# Patient Record
Sex: MALE | Race: ASIAN | NOT HISPANIC OR LATINO | Employment: FULL TIME | URBAN - METROPOLITAN AREA
[De-identification: names, ages, dates, MRNs, and addresses within clinical notes are randomized per-mention and may not be internally consistent; named-entity substitution may affect disease eponyms.]

---

## 2022-11-03 ENCOUNTER — OFFICE VISIT (OUTPATIENT)
Dept: URGENT CARE | Facility: CLINIC | Age: 22
End: 2022-11-03

## 2022-11-03 VITALS
OXYGEN SATURATION: 100 % | HEART RATE: 89 BPM | SYSTOLIC BLOOD PRESSURE: 140 MMHG | TEMPERATURE: 99.7 F | RESPIRATION RATE: 18 BRPM | DIASTOLIC BLOOD PRESSURE: 80 MMHG

## 2022-11-03 DIAGNOSIS — R10.9 FLANK PAIN: Primary | ICD-10-CM

## 2022-11-03 LAB
SL AMB  POCT GLUCOSE, UA: NEGATIVE
SL AMB LEUKOCYTE ESTERASE,UA: NEGATIVE
SL AMB POCT BILIRUBIN,UA: NEGATIVE
SL AMB POCT BLOOD,UA: NEGATIVE
SL AMB POCT CLARITY,UA: CLEAR
SL AMB POCT COLOR,UA: ABNORMAL
SL AMB POCT KETONES,UA: ABNORMAL
SL AMB POCT NITRITE,UA: NEGATIVE
SL AMB POCT PH,UA: 6.5
SL AMB POCT SPECIFIC GRAVITY,UA: 1.02
SL AMB POCT URINE PROTEIN: NEGATIVE
SL AMB POCT UROBILINOGEN: 0.2

## 2022-11-03 RX ORDER — MONTELUKAST SODIUM 10 MG/1
TABLET ORAL
COMMUNITY
Start: 2022-08-11

## 2022-11-03 NOTE — PROGRESS NOTES
[facility]        NAME: Hipolito Solis is a 20 y o  male  : 2000    MRN: [id number]  DATE: November 3, 2022  TIME: 5:39 PM    Assessment and Plan   Flank pain [R10 9]  1  Flank pain  POCT urine dip         Patient Instructions       Follow up with PCP in 3-5 days  Proceed to  ER if symptoms worsen  Chief Complaint     Chief Complaint   Patient presents with   • Flank Pain     Pt reports BL flank pain since this morning  No increased urgency or frequency  History of Present Illness       Yesterday in the morning he had bilateral flank pain and a sensation that he had urinate  This abated and then the sensation happened again the next morning  Since then no symptoms  No urgency no frequency  Penile discharge  Ice concern about sexually transmitted diseases  No GI symptoms  Review of Systems   Review of Systems   Genitourinary: Positive for flank pain and urgency  Current Medications       Current Outpatient Medications:   •  montelukast (SINGULAIR) 10 mg tablet, , Disp: , Rfl:     Current Allergies     Allergies as of 2022   • (No Known Allergies)            The following portions of the patient's history were reviewed and updated as appropriate: allergies, current medications, past family history, past medical history, past social history, past surgical history and problem list      Past Medical History:   Diagnosis Date   • Allergic    • Asthma        History reviewed  No pertinent surgical history  No family history on file  Medications have been verified  Objective   /80   Pulse 89   Temp 99 7 °F (37 6 °C)   Resp 18   SpO2 100%   No LMP for male patient         Physical Exam     Physical Exam  Genitourinary:     Comments: No CVA tenderness to percussion      Urinalysis shows no red cells no leukocyte

## 2022-11-03 NOTE — PATIENT INSTRUCTIONS
At the present time I can not explain the flank pain or the urgency    If this continues to happen please return for further examination

## 2024-01-12 ENCOUNTER — APPOINTMENT (EMERGENCY)
Dept: RADIOLOGY | Facility: HOSPITAL | Age: 24
End: 2024-01-12
Payer: COMMERCIAL

## 2024-01-12 ENCOUNTER — OFFICE VISIT (OUTPATIENT)
Dept: URGENT CARE | Facility: CLINIC | Age: 24
End: 2024-01-12
Payer: COMMERCIAL

## 2024-01-12 ENCOUNTER — HOSPITAL ENCOUNTER (EMERGENCY)
Facility: HOSPITAL | Age: 24
Discharge: HOME | End: 2024-01-12
Attending: EMERGENCY MEDICINE
Payer: COMMERCIAL

## 2024-01-12 VITALS
OXYGEN SATURATION: 99 % | DIASTOLIC BLOOD PRESSURE: 82 MMHG | RESPIRATION RATE: 18 BRPM | HEART RATE: 94 BPM | TEMPERATURE: 99.5 F | SYSTOLIC BLOOD PRESSURE: 130 MMHG | WEIGHT: 216.8 LBS

## 2024-01-12 VITALS
DIASTOLIC BLOOD PRESSURE: 95 MMHG | TEMPERATURE: 97.2 F | RESPIRATION RATE: 18 BRPM | OXYGEN SATURATION: 100 % | SYSTOLIC BLOOD PRESSURE: 168 MMHG | HEART RATE: 94 BPM

## 2024-01-12 DIAGNOSIS — N20.0 KIDNEY STONE: ICD-10-CM

## 2024-01-12 DIAGNOSIS — R10.9 FLANK PAIN: Primary | ICD-10-CM

## 2024-01-12 DIAGNOSIS — R30.0 BURNING WITH URINATION: Primary | ICD-10-CM

## 2024-01-12 DIAGNOSIS — N20.1 URETEROLITHIASIS: ICD-10-CM

## 2024-01-12 DIAGNOSIS — R11.0 NAUSEA: ICD-10-CM

## 2024-01-12 LAB
ATRIAL RATE: 87 BPM
BACTERIA UR QL AUTO: NORMAL /HPF
BILIRUB UR QL STRIP: NEGATIVE
CLARITY UR: CLEAR
COLOR UR: COLORLESS
GLUCOSE UR STRIP-MCNC: NEGATIVE MG/DL
HGB UR QL STRIP.AUTO: ABNORMAL
KETONES UR STRIP-MCNC: NEGATIVE MG/DL
LEUKOCYTE ESTERASE UR QL STRIP: NEGATIVE
NITRITE UR QL STRIP: NEGATIVE
NON-SQ EPI CELLS URNS QL MICRO: NORMAL /HPF
P AXIS: 10 DEGREES
PH UR STRIP.AUTO: 5.5 [PH]
PR INTERVAL: 124 MS
PROT UR STRIP-MCNC: NEGATIVE MG/DL
QRS AXIS: 97 DEGREES
QRSD INTERVAL: 92 MS
QT INTERVAL: 346 MS
QTC INTERVAL: 416 MS
RBC #/AREA URNS AUTO: NORMAL /HPF
SL AMB  POCT GLUCOSE, UA: ABNORMAL
SL AMB LEUKOCYTE ESTERASE,UA: ABNORMAL
SL AMB POCT BILIRUBIN,UA: ABNORMAL
SL AMB POCT BLOOD,UA: ABNORMAL
SL AMB POCT CLARITY,UA: CLEAR
SL AMB POCT COLOR,UA: YELLOW
SL AMB POCT KETONES,UA: ABNORMAL
SL AMB POCT NITRITE,UA: ABNORMAL
SL AMB POCT PH,UA: 6
SL AMB POCT SPECIFIC GRAVITY,UA: 1.02
SL AMB POCT URINE PROTEIN: ABNORMAL
SL AMB POCT UROBILINOGEN: ABNORMAL
SP GR UR STRIP.AUTO: 1.01 (ref 1–1.03)
T WAVE AXIS: 38 DEGREES
UROBILINOGEN UR STRIP-ACNC: <2 MG/DL
VENTRICULAR RATE: 87 BPM
WBC #/AREA URNS AUTO: NORMAL /HPF

## 2024-01-12 PROCEDURE — 99284 EMERGENCY DEPT VISIT MOD MDM: CPT | Performed by: EMERGENCY MEDICINE

## 2024-01-12 PROCEDURE — G1004 CDSM NDSC: HCPCS

## 2024-01-12 PROCEDURE — 93005 ELECTROCARDIOGRAM TRACING: CPT

## 2024-01-12 PROCEDURE — 87086 URINE CULTURE/COLONY COUNT: CPT

## 2024-01-12 PROCEDURE — 81001 URINALYSIS AUTO W/SCOPE: CPT

## 2024-01-12 PROCEDURE — 81002 URINALYSIS NONAUTO W/O SCOPE: CPT

## 2024-01-12 PROCEDURE — 99213 OFFICE O/P EST LOW 20 MIN: CPT

## 2024-01-12 PROCEDURE — 96372 THER/PROPH/DIAG INJ SC/IM: CPT

## 2024-01-12 PROCEDURE — 99284 EMERGENCY DEPT VISIT MOD MDM: CPT

## 2024-01-12 PROCEDURE — 74176 CT ABD & PELVIS W/O CONTRAST: CPT

## 2024-01-12 RX ORDER — ONDANSETRON 4 MG/1
4 TABLET, FILM COATED ORAL EVERY 6 HOURS
Qty: 12 TABLET | Refills: 0 | Status: SHIPPED | OUTPATIENT
Start: 2024-01-12 | End: 2024-01-31

## 2024-01-12 RX ORDER — TAMSULOSIN HYDROCHLORIDE 0.4 MG/1
0.4 CAPSULE ORAL
Qty: 5 CAPSULE | Refills: 0 | Status: SHIPPED | OUTPATIENT
Start: 2024-01-12 | End: 2024-01-31

## 2024-01-12 RX ORDER — IBUPROFEN 600 MG/1
600 TABLET ORAL EVERY 6 HOURS PRN
Qty: 30 TABLET | Refills: 0 | Status: SHIPPED | OUTPATIENT
Start: 2024-01-12

## 2024-01-12 RX ORDER — KETOROLAC TROMETHAMINE 30 MG/ML
15 INJECTION, SOLUTION INTRAMUSCULAR; INTRAVENOUS ONCE
Status: COMPLETED | OUTPATIENT
Start: 2024-01-12 | End: 2024-01-12

## 2024-01-12 RX ORDER — OXYCODONE HYDROCHLORIDE 5 MG/1
5 TABLET ORAL EVERY 4 HOURS PRN
Qty: 12 TABLET | Refills: 0 | Status: SHIPPED | OUTPATIENT
Start: 2024-01-12 | End: 2024-01-31

## 2024-01-12 RX ORDER — ONDANSETRON 4 MG/1
4 TABLET, ORALLY DISINTEGRATING ORAL ONCE
Status: COMPLETED | OUTPATIENT
Start: 2024-01-12 | End: 2024-01-12

## 2024-01-12 RX ORDER — ACETAMINOPHEN 325 MG/1
975 TABLET ORAL ONCE
Status: COMPLETED | OUTPATIENT
Start: 2024-01-12 | End: 2024-01-12

## 2024-01-12 RX ORDER — ONDANSETRON 4 MG/1
4 TABLET, ORALLY DISINTEGRATING ORAL EVERY 6 HOURS PRN
Status: SHIPPED | OUTPATIENT
Start: 2024-01-12

## 2024-01-12 RX ORDER — NAPROXEN 500 MG/1
500 TABLET ORAL 2 TIMES DAILY WITH MEALS
Qty: 30 TABLET | Refills: 0 | Status: SHIPPED | OUTPATIENT
Start: 2024-01-12 | End: 2024-01-31

## 2024-01-12 RX ADMIN — ACETAMINOPHEN 975 MG: 325 TABLET, FILM COATED ORAL at 20:57

## 2024-01-12 RX ADMIN — KETOROLAC TROMETHAMINE 15 MG: 30 INJECTION, SOLUTION INTRAMUSCULAR; INTRAVENOUS at 20:57

## 2024-01-12 RX ADMIN — ONDANSETRON 4 MG: 4 TABLET, ORALLY DISINTEGRATING ORAL at 20:57

## 2024-01-13 LAB — BACTERIA UR CULT: NORMAL

## 2024-01-13 NOTE — ED PROVIDER NOTES
History  Chief Complaint   Patient presents with    Flank Pain     PT was sent from urgent care for right flank pain radiates towards back. Pt states he had pain while urinating earlier and went to urgent care. They told him to get checked out here.      23-year-old male no significant past medical history presents to ED with 1 day of right-sided flank pain dysuria and nausea.  Patient woke this morning and used usual state of health, gradually developed symptoms throughout the day.  He presented to urgent care where he had a urinalysis showing hematuria however no bacteria or leukocytes.  He was referred to the ED for evaluation.  He currently endorses mild nausea but no vomiting right-sided flank pain which radiates to the suprapubic region.  No history of abdominal surgeries, no history of kidney stones.  No medication taken prior to ED visit.  No fevers chills.  No penile discharge.        Prior to Admission Medications   Prescriptions Last Dose Informant Patient Reported? Taking?   ibuprofen (MOTRIN) 600 mg tablet   No No   Sig: Take 1 tablet (600 mg total) by mouth every 6 (six) hours as needed for mild pain or moderate pain   montelukast (SINGULAIR) 10 mg tablet   Yes No      Facility-Administered Medications Last Administration Doses Remaining   ondansetron (ZOFRAN-ODT) dispersible tablet 4 mg None recorded           Past Medical History:   Diagnosis Date    Allergic     Asthma        History reviewed. No pertinent surgical history.    History reviewed. No pertinent family history.  I have reviewed and agree with the history as documented.    E-Cigarette/Vaping     E-Cigarette/Vaping Substances     Social History     Tobacco Use    Smoking status: Never    Smokeless tobacco: Never        Review of Systems   Constitutional:  Negative for chills and fever.   HENT:  Negative for ear pain and sore throat.    Eyes:  Negative for pain and visual disturbance.   Respiratory:  Negative for cough and shortness of  breath.    Cardiovascular:  Negative for chest pain and palpitations.   Gastrointestinal:  Negative for abdominal pain and vomiting.   Genitourinary:  Positive for dysuria and flank pain. Negative for hematuria.   Musculoskeletal:  Negative for arthralgias and back pain.   Skin:  Negative for color change and rash.   Neurological:  Negative for seizures and syncope.   All other systems reviewed and are negative.      Physical Exam  ED Triage Vitals [01/12/24 1957]   Temperature Pulse Respirations Blood Pressure SpO2   (!) 97.2 °F (36.2 °C) 94 18 168/95 100 %      Temp Source Heart Rate Source Patient Position - Orthostatic VS BP Location FiO2 (%)   Oral -- -- -- --      Pain Score       6             Orthostatic Vital Signs  Vitals:    01/12/24 1957   BP: 168/95   Pulse: 94       Physical Exam  Vitals and nursing note reviewed.   Constitutional:       General: He is not in acute distress.     Appearance: He is well-developed.   HENT:      Head: Normocephalic and atraumatic.   Eyes:      Conjunctiva/sclera: Conjunctivae normal.   Cardiovascular:      Rate and Rhythm: Normal rate and regular rhythm.      Heart sounds: No murmur heard.  Pulmonary:      Effort: Pulmonary effort is normal. No respiratory distress.      Breath sounds: Normal breath sounds.   Abdominal:      Palpations: Abdomen is soft.      Tenderness: There is abdominal tenderness in the suprapubic area. There is right CVA tenderness.   Musculoskeletal:         General: No swelling.      Cervical back: Neck supple.   Skin:     General: Skin is warm and dry.      Capillary Refill: Capillary refill takes less than 2 seconds.   Neurological:      Mental Status: He is alert.   Psychiatric:         Mood and Affect: Mood normal.         ED Medications  Medications   ketorolac (TORADOL) injection 15 mg (15 mg Intramuscular Given 1/12/24 2057)   ondansetron (ZOFRAN-ODT) dispersible tablet 4 mg (4 mg Oral Given 1/12/24 2057)   acetaminophen (TYLENOL) tablet 974  mg (975 mg Oral Given 1/12/24 2057)       Diagnostic Studies  Results Reviewed       Procedure Component Value Units Date/Time    Urine Microscopic [[id number]]  (Normal) Collected: 01/12/24 2141    Lab Status: Final result Specimen: Urine, Clean Catch Updated: 01/12/24 2210     RBC, UA 1-2 /hpf      WBC, UA 1-2 /hpf      Epithelial Cells Occasional /hpf      Bacteria, UA None Seen /hpf     UA w Reflex to Microscopic w Reflex to Culture [[id number]]  (Abnormal) Collected: 01/12/24 2141    Lab Status: Final result Specimen: Urine, Clean Catch Updated: 01/12/24 2209     Color, UA Colorless     Clarity, UA Clear     Specific Gravity, UA 1.010     pH, UA 5.5     Leukocytes, UA Negative     Nitrite, UA Negative     Protein, UA Negative mg/dl      Glucose, UA Negative mg/dl      Ketones, UA Negative mg/dl      Urobilinogen, UA <2.0 mg/dl      Bilirubin, UA Negative     Occult Blood, UA Trace                   CT renal stone study abdomen pelvis wo contrast   Final Result by Omid Rider MD (01/12 2133)      3 mm calculus at the right ureterovesicular junction with associated trace right hydronephrosis.            Workstation performed: [id number]               Procedures  Procedures      ED Course  ED Course as of 01/12/24 2224 Fri Jan 12, 2024 2137 UA w Reflex to Microscopic w Reflex to Culture                             SBIRT 22yo+      Flowsheet Row Most Recent Value   Initial Alcohol Screen: US AUDIT-C     1. How often do you have a drink containing alcohol? 0 Filed at: 01/12/2024 1958   2. How many drinks containing alcohol do you have on a typical day you are drinking?  0 Filed at: 01/12/2024 1958   3a. Male UNDER 65: How often do you have five or more drinks on one occasion? 0 Filed at: 01/12/2024 1958   3b. FEMALE Any Age, or MALE 65+: How often do you have 4 or more drinks on one occassion? 0 Filed at: 01/12/2024 1958   Audit-C Score 0 Filed at: 01/12/2024 1958   CHAD: How many times in the past year have  you...    Used an illegal drug or used a prescription medication for non-medical reasons? Never Filed at: 01/12/2024 1958                  Medical Decision Making  23-year-old male no significant past medical history presents to ED with 1 day of right-sided flank pain dysuria and nausea.      DDx: Kidney stone versus cystitis versus appendicitis (less likely considering flank pain radiating to suprapubic region).    Will obtain urinalysis and CT without contrast to evaluate for kidney stone.  CT positive for small 3 mm stone located at the UVJ.  Mild hydro, no signs of infection.  Pain controlled with Zofran and Toradol.  Discussed continued conservative management at home with pain control and antiemetics. Discussed strict return precautions for development of fevers, chills, intractable pain nausea or vomiting.  Patient understanding.  Otherwise patient stable for discharge with urology outpatient follow-up.  Patient understanding, discharged home in stable condition, ambulated off ED floor on her own without any difficulty.       Amount and/or Complexity of Data Reviewed  Labs:  Decision-making details documented in ED Course.  Radiology: ordered.    Risk  OTC drugs.  Prescription drug management.          Disposition  Final diagnoses:   Flank pain   Ureterolithiasis   Kidney stone     Time reflects when diagnosis was documented in both MDM as applicable and the Disposition within this note       Time User Action Codes Description Comment    1/12/2024  9:47 PM Katherine Delatorre [R10.9] Flank pain     1/12/2024  9:47 PM Katherine Delatorre Add [N20.1] Ureterolithiasis     1/12/2024  9:47 PM Migue Keyes Add [N20.0] Kidney stone           ED Disposition       ED Disposition   Discharge    Condition   Stable    Date/Time   Fri Jan 12, 2024 2210    Comment   Hemant Recinos discharge to home/self care.                   Follow-up Information       Follow up With Specialties Details Why Contact Info Additional Information    [facility] [place] [facility] Call   [address]  [facility] [address] [phone] [facility] [place], [address], [place], [place], [id number] [phone]            Discharge Medication List as of 1/12/2024 10:13 PM        START taking these medications    Details   naproxen (Naprosyn) 500 mg tablet Take 1 tablet (500 mg total) by mouth 2 (two) times a day with meals, Starting Fri 1/12/2024, Normal      ondansetron (ZOFRAN) 4 mg tablet Take 1 tablet (4 mg total) by mouth every 6 (six) hours, Starting Fri 1/12/2024, Normal      oxyCODONE (Roxicodone) 5 immediate release tablet Take 1 tablet (5 mg total) by mouth every 4 (four) hours as needed for moderate pain for up to 12 doses Max Daily Amount: 30 mg, Starting Fri 1/12/2024, Normal      tamsulosin (FLOMAX) 0.4 mg Take 1 capsule (0.4 mg total) by mouth daily with dinner for 5 days, Starting Fri 1/12/2024, Until Wed 1/17/2024, Normal           CONTINUE these medications which have NOT CHANGED    Details   ibuprofen (MOTRIN) 600 mg tablet Take 1 tablet (600 mg total) by mouth every 6 (six) hours as needed for mild pain or moderate pain, Starting Fri 1/12/2024, Normal      montelukast (SINGULAIR) 10 mg tablet Starting u 8/11/2022, Historical Med               PDMP Review       None             ED Provider  Attending physically available and evaluated Hemant Recinos. I managed the patient along with the ED Attending.    Electronically Signed by           Katherine Delatorre MD  01/12/24 9255

## 2024-01-13 NOTE — DISCHARGE INSTRUCTIONS
You were seen in the Emergency Department today for flank pain.    We performed a CT scan which shows you have a small 3mm kidney stone on the right without any signs of a urinary tract infection. We  are sending you home with medication to take for pain (naproxen and tylenol for mild-moderate pain) (oxycodone for severe pain), and zofran for nausea.     Continue to hydrate well at home.    Please follow up with your primary care doctor in 2 to 3 days for recheck.   Please return to the Emergency Department if you experience worsening of your current symptoms or any other concerning symptoms.

## 2024-01-13 NOTE — PROGRESS NOTES
[facility]        NAME: Hemant Recinos is a 23 y.o. male  : 2000    MRN: [id number]  DATE: 2024  TIME: 7:14 PM    Assessment and Plan   Burning with urination [R30.0]  1. Burning with urination  POCT urine dip    POCT urine dip    Urine culture    ibuprofen (MOTRIN) 600 mg tablet    CANCELED: Urine culture            Patient Instructions   Increase your fluids  Take the 600 and Motrin for pain 6 hours as needed  Your urine was negative for bacteria but did have blood  If you notice increased blood in your urine or your pain increases in your right lower back go to the emergency room for further workup  You can take Zofran every 8 hours as needed for nausea  Follow up with PCP in 3-5 days.  Proceed to  ER if symptoms worsen.    Chief Complaint     Chief Complaint   Patient presents with    Back Pain     Patient reports low back pain started this morning, right sided sharp pain a little on the left. Currently taking Cystex, was helping with urinating, he reports it was burning this morning upon urination.         History of Present Illness       This is a 23-year-old male who presents today with onset of burning with urination that started this morning.  He started with right sharp lank pain intermittent in nature he states when he gets the pain it takes his appetite away.  He states he has slight chills but he is not sure if it is just because he is cold he states he does get the pain after voiding.  He denies any abdominal pain nausea vomiting or diarrhea    Back Pain  Associated symptoms include dysuria. Pertinent negatives include no abdominal pain, chest pain or fever.       Review of Systems   Review of Systems   Constitutional:  Negative for chills, fatigue and fever.   HENT: Negative.     Eyes: Negative.    Respiratory: Negative.  Negative for shortness of breath.    Cardiovascular:  Negative for chest pain.   Gastrointestinal:  Negative for abdominal distention, abdominal pain,  diarrhea, nausea and vomiting.   Genitourinary:  Positive for dysuria. Negative for penile discharge, penile pain and penile swelling.   Musculoskeletal:  Positive for back pain.         Current Medications       Current Outpatient Medications:     ibuprofen (MOTRIN) 600 mg tablet, Take 1 tablet (600 mg total) by mouth every 6 (six) hours as needed for mild pain or moderate pain, Disp: 30 tablet, Rfl: 0    montelukast (SINGULAIR) 10 mg tablet, , Disp: , Rfl:     Current Allergies     Allergies as of 01/12/2024    (No Known Allergies)            The following portions of the patient's history were reviewed and updated as appropriate: allergies, current medications, past family history, past medical history, past social history, past surgical history and problem list.     Past Medical History:   Diagnosis Date    Allergic     Asthma        History reviewed. No pertinent surgical history.    History reviewed. No pertinent family history.      Medications have been verified.        Objective   /82   Pulse 94   Temp 99.5 °F (37.5 °C) (Tympanic)   Resp 18   Wt 98.3 kg (216 lb 12.8 oz)   SpO2 99%   No LMP for male patient.       Physical Exam     Physical Exam  Constitutional:       Appearance: Normal appearance.   HENT:      Head: Normocephalic and atraumatic.      Right Ear: Tympanic membrane, ear canal and external ear normal.      Left Ear: Tympanic membrane, ear canal and external ear normal.      Nose: Nose normal.      Mouth/Throat:      Mouth: Mucous membranes are moist.      Pharynx: Oropharynx is clear.   Eyes:      Pupils: Pupils are equal, round, and reactive to light.   Cardiovascular:      Rate and Rhythm: Normal rate and regular rhythm.      Pulses: Normal pulses.      Heart sounds: Normal heart sounds.   Pulmonary:      Effort: Pulmonary effort is normal.      Breath sounds: Normal breath sounds.   Abdominal:      General: Abdomen is flat.      Tenderness: There is no abdominal tenderness. There  is right CVA tenderness.   Musculoskeletal:         General: Normal range of motion.      Cervical back: Normal range of motion.   Skin:     General: Skin is warm and dry.      Capillary Refill: Capillary refill takes less than 2 seconds.   Neurological:      General: No focal deficit present.      Mental Status: He is alert and oriented to person, place, and time.   Psychiatric:         Mood and Affect: Mood normal.         Thought Content: Thought content normal.         Judgment: Judgment normal.

## 2024-01-13 NOTE — PATIENT INSTRUCTIONS
Increase your fluids  Take the 600 and Motrin for pain 6 hours as needed  Your urine was negative for bacteria but did have blood  If you notice increased blood in your urine or your pain increases in your right lower back go to the emergency room for further workup  You can take Zofran every 8 hours as needed for nausea

## 2024-01-21 NOTE — ED ATTENDING ATTESTATION
Final Diagnoses:     1. Flank pain    2. Ureterolithiasis    3. Kidney stone           I, Migue Keyes DO, saw and evaluated the patient. All available labs and X-rays were ordered by me or the resident / non-physician and have been reviewed by myself. I discussed the patient with the resident / non-physician and agree with the resident's / non-physician practitioner's findings and plan as documented in the resident's / non-physician practicitioner's note, except where noted.   At this point, I agree with the current assessment done in the ED.   I was present during key portions of all procedures performed unless otherwise stated.     Nursing Triage:     Chief Complaint   Patient presents with    Flank Pain     PT was sent from urgent care for right flank pain radiates towards back. Pt states he had pain while urinating earlier and went to urgent care. They told him to get checked out here.        HPI:   This is a 23 y.o. male presenting for evaluation of right flank pain.  Cannot get comfortable.  Onset today.  Worse with urinating.  No penile discharge no rash no fevers no other associated symptoms    ASSESSMENT + PLAN:   Flank pain doubt UTI will check urinalysis, doubt appendicitis given lack of right lower quadrant tenderness plan CT renal study to evaluate for kidney stone    Physical:     Vital signs reviewed.  Gen. appearance: No acute distress. Alert and oriented x3.  Head: Atraumatic, normocephalic.  Eyes: EOMI, PERRLA. No icterus.  Neck: Supple, no lymphadenopathy, full range of motion.  Chest: Regular rate and rhythm. Lungs are clear to auscultation bilaterally. Nontender.  Abdomen: Soft nontender nondistended. No signs of ecchymosis. Positive bowel sounds.  Extremities: Full range of motion in all extremities. DTRs intact.  Neuro: Cranial nerves II through XII intact. Nonfocal exam. GCS 15  Skin: Warm, dry.Vital signs reviewed.            Past Medical:    has a past medical history of Allergic and  "Asthma.    Past Surgical:    has no past surgical history on file.      CT renal stone study abdomen pelvis wo contrast   Final Result by Omid Rider MD (01/12 2133)      3 mm calculus at the right ureterovesicular junction with associated trace right hydronephrosis.            Workstation performed: [id number]             Procedures      I reviewed patient's most recent discharge summary and/or outpatient office notes.      Portions of the record may have been created with voice recognition software. Occasional wrong word or \"sound a like\" substitutions may have occurred due to the inherent limitations of voice recognition software. Read the chart carefully and recognize, using context, where substitutions have occurred.    Electronically signed:  Migue Keyes, DO  "

## 2024-01-31 ENCOUNTER — OFFICE VISIT (OUTPATIENT)
Dept: FAMILY MEDICINE CLINIC | Facility: CLINIC | Age: 24
End: 2024-01-31
Payer: COMMERCIAL

## 2024-01-31 VITALS
BODY MASS INDEX: 33.82 KG/M2 | SYSTOLIC BLOOD PRESSURE: 120 MMHG | HEART RATE: 83 BPM | DIASTOLIC BLOOD PRESSURE: 88 MMHG | RESPIRATION RATE: 18 BRPM | OXYGEN SATURATION: 98 % | TEMPERATURE: 98 F | HEIGHT: 67 IN | WEIGHT: 215.5 LBS

## 2024-01-31 DIAGNOSIS — N20.0 KIDNEY STONE: ICD-10-CM

## 2024-01-31 DIAGNOSIS — Z00.00 WELL ADULT EXAM: Primary | ICD-10-CM

## 2024-01-31 PROBLEM — R11.0 NAUSEA: Status: RESOLVED | Noted: 2024-01-12 | Resolved: 2024-01-31

## 2024-01-31 PROCEDURE — 99385 PREV VISIT NEW AGE 18-39: CPT | Performed by: FAMILY MEDICINE

## 2024-01-31 NOTE — ASSESSMENT & PLAN NOTE
Kidney stone.  Patient currently asymptomatic.  He will follow-up with urologist for further evaluation

## 2024-01-31 NOTE — ASSESSMENT & PLAN NOTE
Well adult.  Overall the patient appears to be in stable health.  He will obtain blood work as ordered for further evaluation.  We will make further recommendations pending results of test.  He was recommended to establish a more consistent form of exercise working up to a minimum of 20 minutes 3 times a week.

## 2024-02-21 PROBLEM — Z00.00 WELL ADULT EXAM: Status: RESOLVED | Noted: 2024-01-31 | Resolved: 2024-02-21

## 2024-03-09 ENCOUNTER — APPOINTMENT (OUTPATIENT)
Dept: LAB | Facility: CLINIC | Age: 24
End: 2024-03-09
Payer: COMMERCIAL

## 2024-03-09 DIAGNOSIS — Z00.00 WELL ADULT EXAM: ICD-10-CM

## 2024-03-09 DIAGNOSIS — N20.0 KIDNEY STONE: ICD-10-CM

## 2024-03-09 LAB
ALBUMIN SERPL BCP-MCNC: 4.7 G/DL (ref 3.5–5)
ALP SERPL-CCNC: 63 U/L (ref 34–104)
ALT SERPL W P-5'-P-CCNC: 69 U/L (ref 7–52)
ANION GAP SERPL CALCULATED.3IONS-SCNC: 10 MMOL/L
AST SERPL W P-5'-P-CCNC: 32 U/L (ref 13–39)
BILIRUB SERPL-MCNC: 0.33 MG/DL (ref 0.2–1)
BUN SERPL-MCNC: 11 MG/DL (ref 5–25)
CALCIUM SERPL-MCNC: 9.5 MG/DL (ref 8.4–10.2)
CHLORIDE SERPL-SCNC: 102 MMOL/L (ref 96–108)
CHOLEST SERPL-MCNC: 223 MG/DL
CO2 SERPL-SCNC: 28 MMOL/L (ref 21–32)
CREAT SERPL-MCNC: 0.88 MG/DL (ref 0.6–1.3)
ERYTHROCYTE [DISTWIDTH] IN BLOOD BY AUTOMATED COUNT: 13.2 % (ref 11.6–15.1)
GFR SERPL CREATININE-BSD FRML MDRD: 120 ML/MIN/1.73SQ M
GLUCOSE P FAST SERPL-MCNC: 87 MG/DL (ref 65–99)
HCT VFR BLD AUTO: 50.1 % (ref 36.5–49.3)
HDLC SERPL-MCNC: 42 MG/DL
HGB BLD-MCNC: 15.6 G/DL (ref 12–17)
LDLC SERPL CALC-MCNC: 128 MG/DL (ref 0–100)
MCH RBC QN AUTO: 26.6 PG (ref 26.8–34.3)
MCHC RBC AUTO-ENTMCNC: 31.1 G/DL (ref 31.4–37.4)
MCV RBC AUTO: 85 FL (ref 82–98)
NONHDLC SERPL-MCNC: 181 MG/DL
PLATELET # BLD AUTO: 276 THOUSANDS/UL (ref 149–390)
PMV BLD AUTO: 10.2 FL (ref 8.9–12.7)
POTASSIUM SERPL-SCNC: 3.9 MMOL/L (ref 3.5–5.3)
PROT SERPL-MCNC: 7.3 G/DL (ref 6.4–8.4)
RBC # BLD AUTO: 5.87 MILLION/UL (ref 3.88–5.62)
SODIUM SERPL-SCNC: 140 MMOL/L (ref 135–147)
TRIGL SERPL-MCNC: 264 MG/DL
TSH SERPL DL<=0.05 MIU/L-ACNC: 1.09 UIU/ML (ref 0.45–4.5)
WBC # BLD AUTO: 7.25 THOUSAND/UL (ref 4.31–10.16)

## 2024-03-09 PROCEDURE — 85027 COMPLETE CBC AUTOMATED: CPT

## 2024-03-09 PROCEDURE — 36415 COLL VENOUS BLD VENIPUNCTURE: CPT

## 2024-03-09 PROCEDURE — 84443 ASSAY THYROID STIM HORMONE: CPT

## 2024-03-09 PROCEDURE — 80061 LIPID PANEL: CPT

## 2024-03-09 PROCEDURE — 80053 COMPREHEN METABOLIC PANEL: CPT

## 2024-03-28 NOTE — PROGRESS NOTES
"  Assessment and plan:     Ureterolithiasis  -Patient reported to the emergency room on 1/12/2024 with right-sided flank pain  -CT scan that demonstrated a 3 mm calculus at the right UVJ with trace hydronephrosis  -Symptoms resolved around 2 weeks after they initially started  -Kidney and bladder ultrasound and KUB ordered, will call patient with results  -Continue with diet and lifestyle modifications  -No plan for follow-up at this time, can call our office if he starts to have any other symptoms or concerns      History of Present Illness     Hemant Recinos is a 23 y.o. male who presents for follow-up for ureterolithiasis.  He reported to the ER on 1/12/2020 for complaints of right-sided flank pain.  He reports that 2 weeks after he presented to the ER the symptoms resolved and he believes that he passed the kidney stone.  He is not sure if he solid in the toilet.  Today he denies any flank pain, abdominal pain, hematuria, fever, chills, nausea or vomiting.  He denies any history or family history of kidney stones.  We discussed lifestyle and diet modifications.  He reports that he used to drink a lot of iced tea and cola beverages but he has started to limit those to around 1 a day.    Laboratory     Lab Results   Component Value Date    BUN 11 03/09/2024    CREATININE 0.88 03/09/2024       No components found for: \"GFR\"    Lab Results   Component Value Date    CALCIUM 9.5 03/09/2024    K 3.9 03/09/2024    CO2 28 03/09/2024     03/09/2024       Lab Results   Component Value Date    WBC 7.25 03/09/2024    HGB 15.6 03/09/2024    HCT 50.1 (H) 03/09/2024    MCV 85 03/09/2024     03/09/2024       No results found for: \"PSA\"    No results found for this or any previous visit (from the past 1 hour(s)).    @RESULT(URINEMICROSCOPIC)@    @RESULT(URINECULTURE)@    Radiology       Review of Systems     Review of Systems   Constitutional:  Negative for chills and fever.   Respiratory: Negative.  Negative for " cough and shortness of breath.    Cardiovascular: Negative.  Negative for chest pain.   Gastrointestinal: Negative.  Negative for abdominal distention, abdominal pain, nausea and vomiting.   Genitourinary:  Negative for decreased urine volume, difficulty urinating, dysuria, flank pain, frequency, hematuria, penile discharge, penile pain, penile swelling, scrotal swelling, testicular pain and urgency.   Skin: Negative.  Negative for rash.   Neurological: Negative.    Hematological:  Negative for adenopathy. Does not bruise/bleed easily.         AUA SYMPTOM SCORE      Flowsheet Row Most Recent Value   AUA SYMPTOM SCORE    How often have you had a sensation of not emptying your bladder completely after you finished urinating? 0 (P)    How often have you had to urinate again less than two hours after you finished urinating? 0 (P)    How often have you found you stopped and started again several times when you urinate? 0 (P)    How often have you found it difficult to postpone urination? 0 (P)    How often have you had a weak urinary stream? 0 (P)    How often have you had to push or strain to begin urination? 0 (P)    How many times did you most typically get up to urinate from the time you went to bed at night until the time you got up in the morning? 0 (P)    Quality of Life: If you were to spend the rest of your life with your urinary condition just the way it is now, how would you feel about that? 0 (P)    AUA SYMPTOM SCORE 0 (P)               Allergies     No Known Allergies    Physical Exam     Physical Exam  Vitals reviewed.   Constitutional:       Appearance: Normal appearance.   HENT:      Head: Normocephalic and atraumatic.   Eyes:      Pupils: Pupils are equal, round, and reactive to light.   Cardiovascular:      Rate and Rhythm: Normal rate.   Pulmonary:      Effort: Pulmonary effort is normal.   Musculoskeletal:      Cervical back: Normal range of motion.   Skin:     General: Skin is warm and dry.    Neurological:      General: No focal deficit present.      Mental Status: He is alert and oriented to person, place, and time.   Psychiatric:         Mood and Affect: Mood normal.         Behavior: Behavior normal.         Thought Content: Thought content normal.         Judgment: Judgment normal.         Vital Signs     Vitals:    03/29/24 1300   BP: 118/80   BP Location: Left arm   Patient Position: Sitting   Cuff Size: Large   Pulse: 83   SpO2: 99%   Weight: 98.4 kg (217 lb)       Current Medications     No current outpatient medications on file.    Active Problems     Patient Active Problem List   Diagnosis    Burning with urination    Kidney stone       Past Medical History     Past Medical History:   Diagnosis Date    Allergic     Asthma        Surgical History     History reviewed. No pertinent surgical history.    Family History     Family History   Problem Relation Age of Onset    No Known Problems Father     No Known Problems Mother        Social History     Social History     Social History     Tobacco Use   Smoking Status Never   Smokeless Tobacco Never       History reviewed. No pertinent surgical history.      The following portions of the patient's history were reviewed and updated as appropriate: allergies, current medications, past family history, past medical history, past social history, past surgical history and problem list    Please note :  Voice dictation software has been used to create this document.  There may be inadvertent transcription errors.    STEPHON Paiz

## 2024-03-29 ENCOUNTER — OFFICE VISIT (OUTPATIENT)
Dept: UROLOGY | Facility: AMBULATORY SURGERY CENTER | Age: 24
End: 2024-03-29
Payer: COMMERCIAL

## 2024-03-29 VITALS
DIASTOLIC BLOOD PRESSURE: 80 MMHG | WEIGHT: 217 LBS | SYSTOLIC BLOOD PRESSURE: 118 MMHG | BODY MASS INDEX: 33.99 KG/M2 | OXYGEN SATURATION: 99 % | HEART RATE: 83 BPM

## 2024-03-29 DIAGNOSIS — R10.9 FLANK PAIN: ICD-10-CM

## 2024-03-29 DIAGNOSIS — N20.1 URETEROLITHIASIS: Primary | ICD-10-CM

## 2024-03-29 PROCEDURE — 99203 OFFICE O/P NEW LOW 30 MIN: CPT

## 2024-04-05 ENCOUNTER — HOSPITAL ENCOUNTER (OUTPATIENT)
Dept: RADIOLOGY | Facility: HOSPITAL | Age: 24
Discharge: HOME | End: 2024-04-05
Payer: COMMERCIAL

## 2024-04-05 DIAGNOSIS — N20.1 URETEROLITHIASIS: ICD-10-CM

## 2024-04-05 PROCEDURE — 76775 US EXAM ABDO BACK WALL LIM: CPT

## 2024-04-15 ENCOUNTER — TELEPHONE (OUTPATIENT)
Dept: UROLOGY | Facility: AMBULATORY SURGERY CENTER | Age: 24
End: 2024-04-15

## 2024-04-15 NOTE — TELEPHONE ENCOUNTER
Please call Hemant and let him know that I reviewed his US and his kidney stone has passed. He does not require any additional follow up at this time.

## 2024-05-06 ENCOUNTER — TELEPHONE (OUTPATIENT)
Age: 24
End: 2024-05-06

## 2024-05-06 NOTE — TELEPHONE ENCOUNTER
Called and spoke with patient and let him know he can drop off forms and Dr. Thornton will signed them.   Per providers request after a verbal confirmation with provider

## 2024-05-06 NOTE — TELEPHONE ENCOUNTER
Patient was seen on 01/31/2024 for his physical .He's starting a new job and needs some papers filled out for work Physical. He wanted to check if Dr Thornton could fill out the forms for him or he would need to schedule the appointment. Please advise.

## 2024-05-09 ENCOUNTER — TELEPHONE (OUTPATIENT)
Dept: FAMILY MEDICINE CLINIC | Facility: CLINIC | Age: 24
End: 2024-05-09

## 2024-05-10 NOTE — TELEPHONE ENCOUNTER
Left message for patient to see if he had tb skin test done, if he didn't have it done, patient will need to have one done per Dr. Thornton please schedule as a nurse visit

## 2024-05-13 ENCOUNTER — CLINICAL SUPPORT (OUTPATIENT)
Dept: FAMILY MEDICINE CLINIC | Facility: CLINIC | Age: 24
End: 2024-05-13
Payer: COMMERCIAL

## 2024-05-13 DIAGNOSIS — Z11.1 SCREENING FOR TUBERCULOSIS: Primary | ICD-10-CM

## 2024-05-13 PROCEDURE — 86580 TB INTRADERMAL TEST: CPT

## 2024-05-15 LAB
INDURATION: 0 MM
TB SKIN TEST: NEGATIVE

## 2025-02-03 ENCOUNTER — OFFICE VISIT (OUTPATIENT)
Dept: FAMILY MEDICINE CLINIC | Facility: CLINIC | Age: 25
End: 2025-02-03
Payer: COMMERCIAL

## 2025-02-03 VITALS
DIASTOLIC BLOOD PRESSURE: 80 MMHG | OXYGEN SATURATION: 98 % | HEIGHT: 66 IN | SYSTOLIC BLOOD PRESSURE: 130 MMHG | RESPIRATION RATE: 18 BRPM | TEMPERATURE: 98.2 F | HEART RATE: 70 BPM | WEIGHT: 224.38 LBS | BODY MASS INDEX: 36.06 KG/M2

## 2025-02-03 DIAGNOSIS — J30.1 NON-SEASONAL ALLERGIC RHINITIS DUE TO POLLEN: ICD-10-CM

## 2025-02-03 DIAGNOSIS — Z00.00 WELL ADULT EXAM: Primary | ICD-10-CM

## 2025-02-03 PROCEDURE — 99395 PREV VISIT EST AGE 18-39: CPT | Performed by: FAMILY MEDICINE

## 2025-02-03 RX ORDER — MONTELUKAST SODIUM 10 MG/1
10 TABLET ORAL
Qty: 90 TABLET | Refills: 3 | Status: SHIPPED | OUTPATIENT
Start: 2025-02-03

## 2025-02-03 NOTE — ASSESSMENT & PLAN NOTE
Well adult.  Overall the patient appears to be in stable health.  He will obtain blood work as ordered.  We will make further recommendations pending results of test.

## 2025-02-03 NOTE — PROGRESS NOTES
FAMILY PRACTICE OFFICE VISIT       NAME: Hemant Recinos  AGE: 24 y.o. SEX: male       : 2000        MRN: [id number]    DATE: 2/3/2025  TIME: 8:14 AM    Assessment and Plan     Problem List Items Addressed This Visit       Well adult exam - Primary    Well adult.  Overall the patient appears to be in stable health.  He will obtain blood work as ordered.  We will make further recommendations pending results of test.         Relevant Orders    CBC    Comprehensive metabolic panel    Lipid panel    TSH, 3rd generation    Non-seasonal allergic rhinitis due to pollen    Allergic rhinitis.  Patient given prescription to initiate Singulair 10 mg 1 p.o. daily.  Patient will call if symptoms persist after the first 90-day prescription.         Relevant Medications    montelukast (SINGULAIR) 10 mg tablet           Chief Complaint     Chief Complaint   Patient presents with    Well Check     Physical        History of Present Illness     Patient in the office for annual wellness exam.  He denies any recent illness.  His biggest complaint is of allergic rhinitis where he has sneezing and runny nose most of the time throughout the year.  As a teenager he had been on Allegra and Singulair.  He denies any changes in his work or home environment.  He is a non-smoker.    Patient tries to workout 3 to 4 days a week with his girlfriend.  He denies any changes in weight recently.        Review of Systems   Review of Systems   Constitutional: Negative.    HENT:  Positive for postnasal drip.    Eyes: Negative.    Respiratory: Negative.     Cardiovascular: Negative.    Gastrointestinal: Negative.    Genitourinary: Negative.    Musculoskeletal: Negative.    Skin: Negative.    Allergic/Immunologic: Positive for environmental allergies.   Neurological: Negative.    Psychiatric/Behavioral: Negative.         Active Problem List     Patient Active Problem List   Diagnosis    Burning with urination    Kidney stone    Well adult exam     Non-seasonal allergic rhinitis due to pollen       Past Medical History:  Past Medical History:   Diagnosis Date    Allergic     Asthma        Past Surgical History:  History reviewed. No pertinent surgical history.    Family History:  Family History   Problem Relation Age of Onset    No Known Problems Father     No Known Problems Mother        Social History:  Social History     Socioeconomic History    Marital status: Single     Spouse name: Not on file    Number of children: Not on file    Years of education: Not on file    Highest education level: Not on file   Occupational History    Not on file   Tobacco Use    Smoking status: Never    Smokeless tobacco: Never   Vaping Use    Vaping status: Never Used   Substance and Sexual Activity    Alcohol use: Yes     Comment: social    Drug use: Never    Sexual activity: Yes   Other Topics Concern    Not on file   Social History Narrative    Not on file     Social Drivers of Health     Financial Resource Strain: Not on file   Food Insecurity: Not on file   Transportation Needs: Not on file   Physical Activity: Not on file   Stress: Not on file   Social Connections: Not on file   Intimate Partner Violence: Not on file   Housing Stability: Not on file       Objective     Vitals:    02/03/25 0726   BP: 130/80   Pulse: 70   Resp: 18   Temp: 98.2 °F (36.8 °C)   SpO2: 98%     Wt Readings from Last 3 Encounters:   02/03/25 102 kg (224 lb 6 oz)   03/29/24 98.4 kg (217 lb)   01/31/24 97.8 kg (215 lb 8 oz)       Physical Exam  Constitutional:       General: He is not in acute distress.     Appearance: Normal appearance. He is not ill-appearing.   HENT:      Head: Normocephalic and atraumatic.      Right Ear: Tympanic membrane, ear canal and external ear normal. There is no impacted cerumen.      Left Ear: Tympanic membrane, ear canal and external ear normal. There is no impacted cerumen.   Eyes:      General:         Right eye: No discharge.         Left eye: No discharge.       "Extraocular Movements: Extraocular movements intact.      Conjunctiva/sclera: Conjunctivae normal.      Pupils: Pupils are equal, round, and reactive to light.   Neck:      Vascular: No carotid bruit.   Cardiovascular:      Rate and Rhythm: Normal rate and regular rhythm.      Heart sounds: Normal heart sounds. No murmur heard.  Pulmonary:      Effort: Pulmonary effort is normal.      Breath sounds: Normal breath sounds. No wheezing, rhonchi or rales.   Abdominal:      General: Abdomen is flat. Bowel sounds are normal. There is no distension.      Palpations: Abdomen is soft.      Tenderness: There is no abdominal tenderness. There is no guarding or rebound.   Musculoskeletal:      Right lower leg: No edema.      Left lower leg: No edema.   Lymphadenopathy:      Cervical: No cervical adenopathy.   Skin:     Findings: No rash.   Neurological:      General: No focal deficit present.      Mental Status: He is alert and oriented to person, place, and time.      Cranial Nerves: No cranial nerve deficit.   Psychiatric:         Mood and Affect: Mood normal.         Behavior: Behavior normal.         Thought Content: Thought content normal.         Judgment: Judgment normal.         Pertinent Laboratory/Diagnostic Studies:  Lab Results   Component Value Date    BUN 11 03/09/2024    CREATININE 0.88 03/09/2024    CALCIUM 9.5 03/09/2024    K 3.9 03/09/2024    CO2 28 03/09/2024     03/09/2024     Lab Results   Component Value Date    ALT 69 (H) 03/09/2024    AST 32 03/09/2024    ALKPHOS 63 03/09/2024       Lab Results   Component Value Date    WBC 7.25 03/09/2024    HGB 15.6 03/09/2024    HCT 50.1 (H) 03/09/2024    MCV 85 03/09/2024     03/09/2024       No results found for: \"TSH\"    No results found for: \"CHOL\"  Lab Results   Component Value Date    TRIG 264 (H) 03/09/2024     Lab Results   Component Value Date    HDL 42 03/09/2024     Lab Results   Component Value Date    LDLCALC 128 (H) 03/09/2024     No results " "found for: \"HGBA1C\"    Results for orders placed or performed in visit on 05/13/24   TB Skin Test    Collection Time: 05/15/24 10:07 AM   Result Value Ref Range    TB Skin Test Negative     Induration 0.0 mm       Orders Placed This Encounter   Procedures    CBC    Comprehensive metabolic panel    Lipid panel    TSH, 3rd generation       ALLERGIES:  No Known Allergies    Current Medications     Current Outpatient Medications   Medication Sig Dispense Refill    montelukast (SINGULAIR) 10 mg tablet Take 1 tablet (10 mg total) by mouth daily at bedtime 90 tablet 3     No current facility-administered medications for this visit.         Health Maintenance     Health Maintenance   Topic Date Due    Hepatitis C Screening  Never done    HIV Screening  Never done    Influenza Vaccine (1) 09/01/2024    COVID-19 Vaccine (1 - 2024-25 season) Never done    Annual Physical  01/31/2025    Depression Screening  02/03/2026    DTaP,Tdap,and Td Vaccines (8 - Td or Tdap) 06/22/2028    Zoster Vaccine (1 of 2) 08/16/2049    Meningococcal B Vaccine  Completed    HIB Vaccine  Completed    IPV Vaccine  Completed    Hepatitis A Vaccine  Completed    Meningococcal ACWY Vaccine  Completed    HPV Vaccine  Completed    RSV Vaccine age 0-20 Months  Aged Out    Pneumococcal Vaccine: Pediatrics (0 to 5 Years) and At-Risk Patients (6 to 64 Years)  Aged Out     Immunization History   Administered Date(s) Administered    DTaP 2000, 2000, 02/21/2000, 10/24/2003    DTaP / HiB 01/20/2001    H1N1, All Formulations 11/25/2009    HPV9 12/09/2016, 02/21/2017, 06/14/2017    Hep B / HiB 2000, 2000    Hep B, Adolescent or Pediatric 05/11/2000    Hepatitis A 11/18/2011, 11/21/2012    INFLUENZA 11/04/2008, 11/17/2010, 11/18/2011, 11/21/2012, 11/25/2013, 11/26/2014, 10/23/2015, 12/09/2016    IPV 2000, 2000, 02/21/2000, 10/24/2003    MMR 08/25/2000, 10/24/2003    Meningococcal B, Recombinant (TRUMENBA) 06/16/2017, 06/22/2018    " Meningococcal MCV4, Unspecified 11/01/2010, 12/08/2015    Pneumococcal Conjugate PCV 7 02/19/2001    Tdap 11/01/2010, 06/22/2018    Tuberculin Skin Test-PPD Intradermal 05/13/2024    Varicella 08/25/2000, 11/04/2008       Jamie Thornton MD

## 2025-02-03 NOTE — ASSESSMENT & PLAN NOTE
Allergic rhinitis.  Patient given prescription to initiate Singulair 10 mg 1 p.o. daily.  Patient will call if symptoms persist after the first 90-day prescription.

## 2025-03-05 PROBLEM — Z00.00 WELL ADULT EXAM: Status: RESOLVED | Noted: 2025-02-03 | Resolved: 2025-03-05

## 2025-03-13 ENCOUNTER — TELEPHONE (OUTPATIENT)
Age: 25
End: 2025-03-13

## 2025-03-13 NOTE — TELEPHONE ENCOUNTER
Pt calling in because he was told if the Singulair did not show any improvement than to call back.    Please advise regarding any alternative medications and give pt a call back with what PCP advises and send to [facility] [place].

## 2025-03-15 ENCOUNTER — APPOINTMENT (OUTPATIENT)
Dept: LAB | Facility: CLINIC | Age: 25
End: 2025-03-15
Payer: COMMERCIAL

## 2025-03-15 DIAGNOSIS — Z00.00 WELL ADULT EXAM: ICD-10-CM

## 2025-03-15 LAB
ALBUMIN SERPL BCG-MCNC: 4.8 G/DL (ref 3.5–5)
ALP SERPL-CCNC: 67 U/L (ref 34–104)
ALT SERPL W P-5'-P-CCNC: 80 U/L (ref 7–52)
ANION GAP SERPL CALCULATED.3IONS-SCNC: 10 MMOL/L (ref 4–13)
AST SERPL W P-5'-P-CCNC: 32 U/L (ref 13–39)
BILIRUB SERPL-MCNC: 0.58 MG/DL (ref 0.2–1)
BUN SERPL-MCNC: 12 MG/DL (ref 5–25)
CALCIUM SERPL-MCNC: 9.8 MG/DL (ref 8.4–10.2)
CHLORIDE SERPL-SCNC: 101 MMOL/L (ref 96–108)
CHOLEST SERPL-MCNC: 245 MG/DL (ref ?–200)
CO2 SERPL-SCNC: 27 MMOL/L (ref 21–32)
CREAT SERPL-MCNC: 0.92 MG/DL (ref 0.6–1.3)
ERYTHROCYTE [DISTWIDTH] IN BLOOD BY AUTOMATED COUNT: 13 % (ref 11.6–15.1)
GFR SERPL CREATININE-BSD FRML MDRD: 115 ML/MIN/1.73SQ M
GLUCOSE P FAST SERPL-MCNC: 94 MG/DL (ref 65–99)
HCT VFR BLD AUTO: 50.3 % (ref 36.5–49.3)
HDLC SERPL-MCNC: 41 MG/DL
HGB BLD-MCNC: 15.7 G/DL (ref 12–17)
LDLC SERPL CALC-MCNC: 149 MG/DL (ref 0–100)
MCH RBC QN AUTO: 26.6 PG (ref 26.8–34.3)
MCHC RBC AUTO-ENTMCNC: 31.2 G/DL (ref 31.4–37.4)
MCV RBC AUTO: 85 FL (ref 82–98)
NONHDLC SERPL-MCNC: 204 MG/DL
PLATELET # BLD AUTO: 259 THOUSANDS/UL (ref 149–390)
PMV BLD AUTO: 9.9 FL (ref 8.9–12.7)
POTASSIUM SERPL-SCNC: 4.3 MMOL/L (ref 3.5–5.3)
PROT SERPL-MCNC: 6.8 G/DL (ref 6.4–8.4)
RBC # BLD AUTO: 5.91 MILLION/UL (ref 3.88–5.62)
SODIUM SERPL-SCNC: 138 MMOL/L (ref 135–147)
TRIGL SERPL-MCNC: 276 MG/DL (ref ?–150)
TSH SERPL DL<=0.05 MIU/L-ACNC: 0.69 UIU/ML (ref 0.45–4.5)
WBC # BLD AUTO: 6.13 THOUSAND/UL (ref 4.31–10.16)

## 2025-03-15 PROCEDURE — 84443 ASSAY THYROID STIM HORMONE: CPT

## 2025-03-15 PROCEDURE — 80053 COMPREHEN METABOLIC PANEL: CPT

## 2025-03-15 PROCEDURE — 85027 COMPLETE CBC AUTOMATED: CPT

## 2025-03-15 PROCEDURE — 80061 LIPID PANEL: CPT

## 2025-03-15 PROCEDURE — 36415 COLL VENOUS BLD VENIPUNCTURE: CPT

## 2025-03-17 ENCOUNTER — RESULTS FOLLOW-UP (OUTPATIENT)
Dept: FAMILY MEDICINE CLINIC | Facility: CLINIC | Age: 25
End: 2025-03-17

## 2025-03-17 DIAGNOSIS — J30.1 NON-SEASONAL ALLERGIC RHINITIS DUE TO POLLEN: Primary | ICD-10-CM

## 2025-03-17 RX ORDER — PREDNISONE 20 MG/1
TABLET ORAL
Qty: 11 TABLET | Refills: 0 | Status: SHIPPED | OUTPATIENT
Start: 2025-03-17

## 2025-03-17 NOTE — TELEPHONE ENCOUNTER
Pt called back to follow up on request for alternative medication. Pt stated his allergies are really flared up, singulair not helping. Please follow up

## 2025-03-17 NOTE — TELEPHONE ENCOUNTER
Pt called in and is aware of the high cholesterol results. Pt will look out for diet sheet in the mail and will follow up in 6mo.

## 2025-03-17 NOTE — TELEPHONE ENCOUNTER
Prescription for prednisone was sent to pharmacy.  He will take 2 tablets daily for 3 days, 1 tablet daily for 3 days, 1/2 tablet daily for 4 days.  If symptoms persist after this point or return after treatment then he would have to consider seeing an allergist for consultation to see what he is actually allergic to.

## 2025-03-25 NOTE — TELEPHONE ENCOUNTER
Patient called states Prednisone helped improve patient symptoms.        Please review.  Thank you

## 2025-04-14 ENCOUNTER — OFFICE VISIT (OUTPATIENT)
Dept: FAMILY MEDICINE CLINIC | Facility: CLINIC | Age: 25
End: 2025-04-14
Payer: COMMERCIAL

## 2025-04-14 VITALS
DIASTOLIC BLOOD PRESSURE: 82 MMHG | HEART RATE: 84 BPM | SYSTOLIC BLOOD PRESSURE: 120 MMHG | BODY MASS INDEX: 36.08 KG/M2 | WEIGHT: 224.5 LBS | RESPIRATION RATE: 18 BRPM | OXYGEN SATURATION: 98 % | HEIGHT: 66 IN | TEMPERATURE: 98.2 F

## 2025-04-14 DIAGNOSIS — J02.9 ACUTE PHARYNGITIS, UNSPECIFIED ETIOLOGY: Primary | ICD-10-CM

## 2025-04-14 PROCEDURE — 99213 OFFICE O/P EST LOW 20 MIN: CPT | Performed by: FAMILY MEDICINE

## 2025-04-14 RX ORDER — AZITHROMYCIN 250 MG/1
TABLET, FILM COATED ORAL
Qty: 6 TABLET | Refills: 0 | Status: SHIPPED | OUTPATIENT
Start: 2025-04-14 | End: 2025-04-19

## 2025-04-14 NOTE — PROGRESS NOTES
FAMILY PRACTICE OFFICE VISIT       NAME: Hemant Recinos  AGE: 24 y.o. SEX: male       : 2000        MRN: [id number]    DATE: 2025  TIME: 8:40 AM    Assessment and Plan     Problem List Items Addressed This Visit       Acute pharyngitis - Primary    Pharyngitis.  Patient given prescription for Zithromax Z-Constantine to take as directed for 5 days.  He may use over-the-counter medications as needed.  Patient will call if symptoms persist after medication completed         Relevant Medications    azithromycin (Zithromax) 250 mg tablet           Chief Complaint     Chief Complaint   Patient presents with    Sore Throat     X     Cough       History of Present Illness     Patient in the office with 1 to 2-day history of sore throat.  He states his live-in girlfriend was recently diagnosed with strep throat at an urgent care center.  He denies any documented fevers.  He has minimal coughing.        Review of Systems   Review of Systems   Constitutional: Negative.    HENT:  Positive for sore throat.    Respiratory:  Positive for cough.    Cardiovascular: Negative.    Gastrointestinal: Negative.    Genitourinary: Negative.    Musculoskeletal: Negative.    Skin: Negative.        Active Problem List     Patient Active Problem List   Diagnosis    Burning with urination    Kidney stone    Non-seasonal allergic rhinitis due to pollen    Acute pharyngitis       Past Medical History:  Past Medical History:   Diagnosis Date    Allergic     Asthma        Past Surgical History:  History reviewed. No pertinent surgical history.    Family History:  Family History   Problem Relation Age of Onset    No Known Problems Father     No Known Problems Mother        Social History:  Social History     Socioeconomic History    Marital status: Single     Spouse name: Not on file    Number of children: Not on file    Years of education: Not on file    Highest education level: Not on file   Occupational History    Not on file   Tobacco Use     Smoking status: Never    Smokeless tobacco: Never   Vaping Use    Vaping status: Never Used   Substance and Sexual Activity    Alcohol use: Yes     Comment: social    Drug use: Never    Sexual activity: Yes   Other Topics Concern    Not on file   Social History Narrative    Not on file     Social Drivers of Health     Financial Resource Strain: Not on file   Food Insecurity: Not on file   Transportation Needs: Not on file   Physical Activity: Not on file   Stress: Not on file   Social Connections: Not on file   Intimate Partner Violence: Not on file   Housing Stability: Not on file       Objective     Vitals:    04/14/25 0805   BP: 120/82   Pulse: 84   Resp: 18   Temp: 98.2 °F (36.8 °C)   SpO2: 98%     Wt Readings from Last 3 Encounters:   04/14/25 102 kg (224 lb 8 oz)   02/03/25 102 kg (224 lb 6 oz)   03/29/24 98.4 kg (217 lb)       Physical Exam  Constitutional:       General: He is not in acute distress.     Appearance: Normal appearance. He is not ill-appearing.   HENT:      Head: Normocephalic and atraumatic.      Right Ear: Tympanic membrane, ear canal and external ear normal. There is no impacted cerumen.      Left Ear: Tympanic membrane, ear canal and external ear normal. There is no impacted cerumen.      Mouth/Throat:      Mouth: Mucous membranes are moist.      Pharynx: Posterior oropharyngeal erythema present. No oropharyngeal exudate.   Eyes:      General:         Right eye: No discharge.         Left eye: No discharge.      Extraocular Movements: Extraocular movements intact.      Conjunctiva/sclera: Conjunctivae normal.      Pupils: Pupils are equal, round, and reactive to light.   Neck:      Vascular: No carotid bruit.   Cardiovascular:      Rate and Rhythm: Normal rate and regular rhythm.      Heart sounds: Normal heart sounds. No murmur heard.  Pulmonary:      Effort: Pulmonary effort is normal.      Breath sounds: Normal breath sounds. No wheezing, rhonchi or rales.   Musculoskeletal:       "Right lower leg: No edema.      Left lower leg: No edema.   Lymphadenopathy:      Cervical: No cervical adenopathy.   Skin:     Findings: No rash.   Neurological:      General: No focal deficit present.      Mental Status: He is alert and oriented to person, place, and time.      Cranial Nerves: No cranial nerve deficit.   Psychiatric:         Mood and Affect: Mood normal.         Behavior: Behavior normal.         Thought Content: Thought content normal.         Judgment: Judgment normal.         Pertinent Laboratory/Diagnostic Studies:  Lab Results   Component Value Date    BUN 12 03/15/2025    CREATININE 0.92 03/15/2025    CALCIUM 9.8 03/15/2025    K 4.3 03/15/2025    CO2 27 03/15/2025     03/15/2025     Lab Results   Component Value Date    ALT 80 (H) 03/15/2025    AST 32 03/15/2025    ALKPHOS 67 03/15/2025       Lab Results   Component Value Date    WBC 6.13 03/15/2025    HGB 15.7 03/15/2025    HCT 50.3 (H) 03/15/2025    MCV 85 03/15/2025     03/15/2025       No results found for: \"TSH\"    No results found for: \"CHOL\"  Lab Results   Component Value Date    TRIG 276 (H) 03/15/2025     Lab Results   Component Value Date    HDL 41 03/15/2025     Lab Results   Component Value Date    LDLCALC 149 (H) 03/15/2025     No results found for: \"HGBA1C\"    Results for orders placed or performed in visit on 03/15/25   CBC   Result Value Ref Range    WBC 6.13 4.31 - 10.16 Thousand/uL    RBC 5.91 (H) 3.88 - 5.62 Million/uL    Hemoglobin 15.7 12.0 - 17.0 g/dL    Hematocrit 50.3 (H) 36.5 - 49.3 %    MCV 85 82 - 98 fL    MCH 26.6 (L) 26.8 - 34.3 pg    MCHC 31.2 (L) 31.4 - 37.4 g/dL    RDW 13.0 11.6 - 15.1 %    Platelets 259 149 - 390 Thousands/uL    MPV 9.9 8.9 - 12.7 fL   Comprehensive metabolic panel   Result Value Ref Range    Sodium 138 135 - 147 mmol/L    Potassium 4.3 3.5 - 5.3 mmol/L    Chloride 101 96 - 108 mmol/L    CO2 27 21 - 32 mmol/L    ANION GAP 10 4 - 13 mmol/L    BUN 12 5 - 25 mg/dL    Creatinine 0.92 " 0.60 - 1.30 mg/dL    Glucose, Fasting 94 65 - 99 mg/dL    Calcium 9.8 8.4 - 10.2 mg/dL    AST 32 13 - 39 U/L    ALT 80 (H) 7 - 52 U/L    Alkaline Phosphatase 67 34 - 104 U/L    Total Protein 6.8 6.4 - 8.4 g/dL    Albumin 4.8 3.5 - 5.0 g/dL    Total Bilirubin 0.58 0.20 - 1.00 mg/dL    eGFR 115 ml/min/1.73sq m   Lipid panel   Result Value Ref Range    Cholesterol 245 (H) See Comment mg/dL    Triglycerides 276 (H) See Comment mg/dL    HDL, Direct 41 >=40 mg/dL    LDL Calculated 149 (H) 0 - 100 mg/dL    Non-HDL-Chol (CHOL-HDL) 204 mg/dl   TSH, 3rd generation   Result Value Ref Range    TSH 3RD GENERATON 0.686 0.450 - 4.500 uIU/mL       No orders of the defined types were placed in this encounter.      ALLERGIES:  No Known Allergies    Current Medications     Current Outpatient Medications   Medication Sig Dispense Refill    azithromycin (Zithromax) 250 mg tablet Take 2 tablets (500 mg total) by mouth daily for 1 day, THEN 1 tablet (250 mg total) daily for 4 days. 6 tablet 0    montelukast (SINGULAIR) 10 mg tablet Take 1 tablet (10 mg total) by mouth daily at bedtime 90 tablet 3     No current facility-administered medications for this visit.         Health Maintenance     Health Maintenance   Topic Date Due    Hepatitis C Screening  Never done    HIV Screening  Never done    Influenza Vaccine (1) 09/01/2024    COVID-19 Vaccine (1 - 2024-25 season) Never done    Depression Screening  02/03/2026    Annual Physical  02/03/2026    DTaP,Tdap,and Td Vaccines (8 - Td or Tdap) 06/22/2028    Zoster Vaccine (1 of 2) 08/16/2049    Meningococcal B Vaccine  Completed    HIB Vaccine  Completed    IPV Vaccine  Completed    Hepatitis A Vaccine  Completed    Meningococcal ACWY Vaccine  Completed    HPV Vaccine  Completed    RSV Vaccine age 0-20 Months  Aged Out    Pneumococcal Vaccine: Pediatrics (0 to 5 Years) and At-Risk Patients (6 to 64 Years)  Aged Out     Immunization History   Administered Date(s) Administered    DTaP 2000,  1999, 02/21/2000, 10/24/2003    DTaP / HiB 01/20/2001    H1N1, All Formulations 11/25/2009    HPV9 12/09/2016, 02/21/2017, 06/14/2017    Hep B / HiB 2000, 1999    Hep B, Adolescent or Pediatric 05/11/2000    Hepatitis A 11/18/2011, 11/21/2012    INFLUENZA 11/04/2008, 11/17/2010, 11/18/2011, 11/21/2012, 11/25/2013, 11/26/2014, 10/23/2015, 12/09/2016    IPV 2000, 1999, 02/21/2000, 10/24/2003    MMR 08/25/2000, 10/24/2003    Meningococcal B, Recombinant (TRUMENBA) 06/16/2017, 06/22/2018    Meningococcal MCV4, Unspecified 11/01/2010, 12/08/2015    Pneumococcal Conjugate PCV 7 02/19/2001    Tdap 11/01/2010, 06/22/2018    Tuberculin Skin Test-PPD Intradermal 05/13/2024    Varicella 08/25/2000, 11/04/2008       Jamie Thornton MD

## 2025-04-14 NOTE — ASSESSMENT & PLAN NOTE
Pharyngitis.  Patient given prescription for Zithromax Z-Constantine to take as directed for 5 days.  He may use over-the-counter medications as needed.  Patient will call if symptoms persist after medication completed

## 2025-04-29 ENCOUNTER — OFFICE VISIT (OUTPATIENT)
Dept: FAMILY MEDICINE CLINIC | Facility: CLINIC | Age: 25
End: 2025-04-29
Payer: COMMERCIAL

## 2025-04-29 ENCOUNTER — APPOINTMENT (OUTPATIENT)
Dept: RADIOLOGY | Facility: CLINIC | Age: 25
End: 2025-04-29
Payer: COMMERCIAL

## 2025-04-29 VITALS
BODY MASS INDEX: 36.32 KG/M2 | RESPIRATION RATE: 16 BRPM | DIASTOLIC BLOOD PRESSURE: 90 MMHG | TEMPERATURE: 98.4 F | WEIGHT: 226 LBS | SYSTOLIC BLOOD PRESSURE: 120 MMHG | OXYGEN SATURATION: 97 % | HEART RATE: 80 BPM | HEIGHT: 66 IN

## 2025-04-29 DIAGNOSIS — S69.92XA INJURY OF LEFT WRIST, INITIAL ENCOUNTER: ICD-10-CM

## 2025-04-29 DIAGNOSIS — M25.532 ACUTE PAIN OF LEFT WRIST: Primary | ICD-10-CM

## 2025-04-29 PROCEDURE — 99213 OFFICE O/P EST LOW 20 MIN: CPT | Performed by: NURSE PRACTITIONER

## 2025-04-29 PROCEDURE — 73110 X-RAY EXAM OF WRIST: CPT

## 2025-04-29 NOTE — PROGRESS NOTES
"Name: Hemant Recinos      : 2000      MRN: [id number]  Encounter Provider: STEPHON Vidales  Encounter Date: 2025   Encounter department: [facility]  :  Assessment & Plan  Acute pain of left wrist  Left wrist pain started yesterday after bracing his left hand/arm to prevent fall, on outstretched palm.   Will complete x-ray left wrist.   Suspect sprain of wrist, but cannot exclude fracture.   Continue to brace, ice, rest. Advil as needed.   Office will call with results of x-ray when available.          Injury of left wrist, initial encounter  Left wrist pain started yesterday after bracing his left hand/arm to prevent fall, on outstretched palm.   Will complete x-ray left wrist.   Suspect sprain of wrist, but cannot exclude fracture.   Continue to brace, ice, rest. Advil as needed.   Office will call with results of x-ray when available.   Orders:  •  XR wrist 3+ vw left; Future           History of Present Illness   Hemant Recinos is a 24 year old male presenting today for wrist injury, left.     Yesterday, got home from work, when he walked into the apartment building lobby, his neighbor was on the floor.   He helped his neighbor sit up, and called 911 for him.     Since this left wrist has been hurting.   He is not exactly sure what happened, but feels he slipped when lifting neighbor up to a sitting position. When he slipped he braced his left hand on the stairs nearby, with outstretched palm.     No prior injuries to this wrist.     Using brace today and last night.   Iced it last night.   Took Advil.   Not really helping.     Right hand dominant.    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      Review of Systems   Constitutional: Negative.    Musculoskeletal:  Positive for arthralgias (left wrist.).       Objective   /90   Pulse 80   Temp 98.4 °F (36.9 °C) (Temporal)   Resp 16   Ht 5' 6\" (1.676 m)   Wt 103 kg (226 lb)   SpO2 97%   BMI 36.48 kg/m²      Physical " Exam  Vitals and nursing note reviewed.   Constitutional:       General: He is not in acute distress.     Appearance: Normal appearance. He is not ill-appearing.   Musculoskeletal:      Right wrist: Normal.      Left wrist: Swelling (mild swelling, no erythema, bruising.) present. No deformity, tenderness or crepitus. Normal range of motion. Normal pulse.      Comments: Has full ROM of left wrist.   Passive ROM--has pain with ulnar deviation. No pain with radial deviation, flexion, or extension.   Active ROM has pain with flexion, extension, rotation.   Pain is localized left ulnar wrist.   Neurological:      Mental Status: He is alert.   Psychiatric:         Mood and Affect: Mood normal.         Current Outpatient Medications:   •  montelukast (SINGULAIR) 10 mg tablet, Take 1 tablet (10 mg total) by mouth daily at bedtime, Disp: 90 tablet, Rfl: 3

## 2025-05-01 ENCOUNTER — RESULTS FOLLOW-UP (OUTPATIENT)
Dept: FAMILY MEDICINE CLINIC | Facility: CLINIC | Age: 25
End: 2025-05-01

## 2025-05-14 PROBLEM — J02.9 ACUTE PHARYNGITIS: Status: RESOLVED | Noted: 2025-04-14 | Resolved: 2025-05-14

## 2025-05-31 ENCOUNTER — APPOINTMENT (OUTPATIENT)
Dept: LAB | Facility: CLINIC | Age: 25
End: 2025-05-31
Payer: COMMERCIAL

## 2025-05-31 DIAGNOSIS — H10.13 ALLERGIC CONJUNCTIVITIS OF BOTH EYES: ICD-10-CM

## 2025-05-31 DIAGNOSIS — J31.0 CHRONIC RHINITIS: ICD-10-CM

## 2025-05-31 DIAGNOSIS — R09.81 NASAL CONGESTION: ICD-10-CM

## 2025-05-31 PROCEDURE — 86003 ALLG SPEC IGE CRUDE XTRC EA: CPT

## 2025-05-31 PROCEDURE — 82785 ASSAY OF IGE: CPT

## 2025-05-31 PROCEDURE — 36415 COLL VENOUS BLD VENIPUNCTURE: CPT

## 2025-06-03 LAB
A ALTERNATA IGE QN: <0.1 KUA/I (ref 0–0.1)
A FUMIGATUS IGE QN: <0.1 KUA/I (ref 0–0.1)
BERMUDA GRASS IGE QN: <0.1 KUA/I (ref 0–0.1)
BOXELDER IGE QN: 0.74 KUA/I (ref 0–0.1)
C HERBARUM IGE QN: <0.1 KUA/I (ref 0–0.1)
COTTONWOOD IGE QN: <0.1 KUA/I (ref 0–0.1)
D FARINAE IGE QN: 4.73 KUA/I (ref 0–0.1)
D PTERONYSS IGE QN: 2.01 KUA/I (ref 0–0.1)
DOG DANDER IGE QN: 0.27 KUA/I (ref 0–0.1)
LONDON PLANE IGE QN: <0.1 KUA/I (ref 0–0.1)
MOUSE URINE PROT IGE QN: <0.1 KUA/I (ref 0–0.1)
MT JUNIPER IGE QN: <0.1 KUA/I (ref 0–0.1)
P NOTATUM IGE QN: <0.1 KUA/I (ref 0–0.1)
ROACH IGE QN: <0.1 KUA/I (ref 0–0.1)
SILVER BIRCH IGE QN: 7.28 KUA/I (ref 0–0.1)
TIMOTHY IGE QN: <0.1 KUA/I (ref 0–0.1)
TOTAL IGE SMQN RAST: 39.8 KU/L (ref 0–113)
WALNUT IGE QN: 0.53 KUA/I (ref 0–0.1)
WHITE ASH IGE QN: 1.31 KUA/I (ref 0–0.1)
WHITE MULBERRY IGE QN: <0.1 KUA/I (ref 0–0.1)
WHITE OAK IGE QN: 5 KUA/I (ref 0–0.1)

## 2025-07-07 ENCOUNTER — OFFICE VISIT (OUTPATIENT)
Dept: FAMILY MEDICINE CLINIC | Facility: CLINIC | Age: 25
End: 2025-07-07
Payer: COMMERCIAL

## 2025-07-07 VITALS
WEIGHT: 224 LBS | RESPIRATION RATE: 16 BRPM | HEART RATE: 71 BPM | OXYGEN SATURATION: 97 % | TEMPERATURE: 98 F | DIASTOLIC BLOOD PRESSURE: 80 MMHG | SYSTOLIC BLOOD PRESSURE: 120 MMHG | BODY MASS INDEX: 33.18 KG/M2 | HEIGHT: 69 IN

## 2025-07-07 DIAGNOSIS — L55.9 SUNBURN: Primary | ICD-10-CM

## 2025-07-07 PROCEDURE — 99213 OFFICE O/P EST LOW 20 MIN: CPT | Performed by: NURSE PRACTITIONER

## 2025-07-07 NOTE — LETTER
July 7, 2025     Patient: Hemant Recinos  YOB: 2000  Date of Visit: 7/7/2025      To Whom it May Concern:    Hemant Recinos is under my professional care. Hmeant was seen in my office on 7/7/2025. Hemant may return to work on 7/8/2025.    If you have any questions or concerns, please don't hesitate to call.         Sincerely,          STEPHON Vidales        CC: No Recipients

## 2025-07-07 NOTE — PROGRESS NOTES
"Name: Hemant Recinos      : 2000      MRN: [id number]  Encounter Provider: STEPHON Vidales  Encounter Date: 2025   Encounter department: [facility]  :  Assessment & Plan  Sunburn  Trial OTC lidocaine and aloe topical product, Alocane, for symptom relief.   Work note provided for today.   Call for any questions or concerns.               History of Present Illness   Hemant Recinos is a 24 year old male presenting today for sun burn.     Sunburned on the .   Last night itchy and painful sunburn.   Hard to sleep.   Did not go to work today due to lack of sleep.           Review of Systems   Constitutional: Negative.    Skin:  Positive for rash.       Objective   /80   Pulse 71   Temp 98 °F (36.7 °C) (Temporal)   Resp 16   Ht 5' 8.9\" (1.75 m)   Wt 102 kg (224 lb)   SpO2 97%   BMI 33.18 kg/m²      Physical Exam  Vitals and nursing note reviewed.   Constitutional:       General: He is not in acute distress.     Appearance: Normal appearance. He is not ill-appearing.     Cardiovascular:      Rate and Rhythm: Normal rate and regular rhythm.      Heart sounds: No murmur heard.  Pulmonary:      Effort: Pulmonary effort is normal.      Breath sounds: Normal breath sounds.     Skin:     Comments: Sunburn on arms, shoulders, upper back, face, scalp. No blistering.     Neurological:      Mental Status: He is alert.         "